# Patient Record
Sex: MALE | Race: WHITE | NOT HISPANIC OR LATINO
[De-identification: names, ages, dates, MRNs, and addresses within clinical notes are randomized per-mention and may not be internally consistent; named-entity substitution may affect disease eponyms.]

---

## 2018-08-05 ENCOUNTER — CHARTING TRANS (OUTPATIENT)
Dept: OTHER | Age: 78
End: 2018-08-05

## 2018-10-31 VITALS
WEIGHT: 187.72 LBS | HEART RATE: 81 BPM | TEMPERATURE: 97.6 F | OXYGEN SATURATION: 97 % | HEIGHT: 71 IN | RESPIRATION RATE: 18 BRPM | BODY MASS INDEX: 26.28 KG/M2

## 2019-07-03 ENCOUNTER — WALK IN (OUTPATIENT)
Dept: URGENT CARE | Age: 79
End: 2019-07-03

## 2019-07-03 VITALS
HEIGHT: 71 IN | DIASTOLIC BLOOD PRESSURE: 80 MMHG | BODY MASS INDEX: 25.2 KG/M2 | RESPIRATION RATE: 16 BRPM | HEART RATE: 80 BPM | TEMPERATURE: 98.1 F | WEIGHT: 180 LBS | SYSTOLIC BLOOD PRESSURE: 160 MMHG

## 2019-07-03 DIAGNOSIS — R05.9 COUGH: ICD-10-CM

## 2019-07-03 DIAGNOSIS — B96.89 ACUTE BACTERIAL SINUSITIS: Primary | ICD-10-CM

## 2019-07-03 DIAGNOSIS — J01.90 ACUTE BACTERIAL SINUSITIS: Primary | ICD-10-CM

## 2019-07-03 PROCEDURE — 99214 OFFICE O/P EST MOD 30 MIN: CPT | Performed by: NURSE PRACTITIONER

## 2019-07-03 RX ORDER — SIMVASTATIN 10 MG
10 TABLET ORAL NIGHTLY
COMMUNITY

## 2019-07-03 RX ORDER — INSULIN ASPART 100 [IU]/ML
INJECTION, SUSPENSION SUBCUTANEOUS
COMMUNITY

## 2019-07-03 RX ORDER — LISINOPRIL 10 MG/1
10 TABLET ORAL DAILY
COMMUNITY

## 2019-07-03 RX ORDER — AZITHROMYCIN 250 MG/1
TABLET, FILM COATED ORAL
Qty: 6 TABLET | Refills: 0 | Status: SHIPPED | OUTPATIENT
Start: 2019-07-03 | End: 2019-07-08

## 2019-07-03 ASSESSMENT — ENCOUNTER SYMPTOMS
SHORTNESS OF BREATH: 0
ALLERGIC/IMMUNOLOGIC NEGATIVE: 1
COUGH: 1
HEMATOLOGIC/LYMPHATIC NEGATIVE: 1
CONSTITUTIONAL NEGATIVE: 1
EYES NEGATIVE: 1
WHEEZING: 0
NEUROLOGICAL NEGATIVE: 1
ENDOCRINE NEGATIVE: 1
PSYCHIATRIC NEGATIVE: 1
SINUS PRESSURE: 1

## 2020-11-05 ENCOUNTER — OFFICE VISIT (OUTPATIENT)
Dept: URBAN - METROPOLITAN AREA CLINIC 41 | Facility: CLINIC | Age: 80
End: 2020-11-05
Payer: MEDICARE

## 2020-11-05 PROCEDURE — 92014 COMPRE OPH EXAM EST PT 1/>: CPT | Performed by: OPHTHALMOLOGY

## 2020-11-05 ASSESSMENT — INTRAOCULAR PRESSURE
OS: 16
OD: 16

## 2020-11-05 NOTE — IMPRESSION/PLAN
Impression: Macular degeneration, wet OD. Status: Symptomatic.
s/p Lolita trial OD
s/p Multiple Avastin last 09/24/2020 (consented 03/05/2020) Plan: The patient notes continued distortion. Exam and OCT reveal a PED. An IVFA from 09/24/2020 demonstrated leakage. Fundus photos from 09/24/2020 demonstrated drusen. Recommend Avastin. R/B/A discussed with patient. The risks of Avastin were discussed, including off-label use and compounding pharmacy risks, and the patient elects to proceed with Avastin (bilateral). The risks of bilateral injections were discussed, and the patient elects to proceed. After 2% Subconjunctival anesthesia & Betadine prep, 1.25mg of Avastin was injected in the eye. Cold compress and Tylenol suggested for pain if needed. Thanks, Supriya Cotter. 

Return in 1 month, OCT OU, re eval Nv AMD

## 2020-11-05 NOTE — IMPRESSION/PLAN
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 Moderate. Plan: DM since about 5. No DME. Blood sugar control.

## 2020-12-17 ENCOUNTER — OFFICE VISIT (OUTPATIENT)
Dept: URBAN - METROPOLITAN AREA CLINIC 41 | Facility: CLINIC | Age: 80
End: 2020-12-17
Payer: MEDICARE

## 2020-12-17 DIAGNOSIS — H35.3231 EXUDATIVE AGE-RELATED MACULAR DEGENERATION, BILATERAL, WITH ACTIVE CHOROIDAL NEOVASCULARIZATION: Primary | ICD-10-CM

## 2020-12-17 PROCEDURE — 92134 CPTRZ OPH DX IMG PST SGM RTA: CPT | Performed by: OPHTHALMOLOGY

## 2020-12-17 PROCEDURE — 92014 COMPRE OPH EXAM EST PT 1/>: CPT | Performed by: OPHTHALMOLOGY

## 2020-12-17 ASSESSMENT — INTRAOCULAR PRESSURE
OS: 13
OD: 12

## 2020-12-17 NOTE — IMPRESSION/PLAN
Impression: Macular degeneration, wet OS. Status: Symptomatic.
s/p Multiple Avastin last 11/5/2020 Plan: Exam and OCT reveal a PED. An IVFA from 09/24/2020 demonstrated leakage. Fundus photos from 09/24/2020 demonstrated drusen. Recommend Avastin. R/B/A discussed with patient. The risks of Avastin were discussed, including off-label use and compounding pharmacy risks, and the patient elects to proceed with Avastin (bilateral).

## 2020-12-17 NOTE — IMPRESSION/PLAN
Impression: Macular degeneration, wet OD. Status: Symptomatic.
s/p Lolita trial OD
s/p Multiple Avastin last 11/5/2020 (consented 03/05/2020) Plan: Exam and OCT reveal a PED. An IVFA from 09/24/2020 demonstrated leakage. Fundus photos from 09/24/2020 demonstrated drusen. Recommend Avastin. R/B/A discussed with patient. The risks of Avastin were discussed, including off-label use and compounding pharmacy risks, and the patient elects to proceed with Avastin (bilateral). The risks of bilateral injections were discussed, and the patient elects to proceed. After 2% Subconjunctival anesthesia & Betadine prep, 1.25mg of Avastin was injected in the eye. Cold compress and Tylenol suggested for pain if needed. Thanks, Suzanna Olszewski. 

Return in 1 month, OCT OU, re eval Nv AMD

## 2020-12-17 NOTE — IMPRESSION/PLAN
Impression: Blepharitis, OU. Status: Chronic.  Plan: [facility]

## 2020-12-17 NOTE — IMPRESSION/PLAN
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 Moderate. Plan: DM since about [address]. No DME. Blood sugar control.

## 2021-01-28 ENCOUNTER — OFFICE VISIT (OUTPATIENT)
Dept: URBAN - METROPOLITAN AREA CLINIC 41 | Facility: CLINIC | Age: 81
End: 2021-01-28
Payer: MEDICARE

## 2021-01-28 DIAGNOSIS — H35.372 PUCKERING OF MACULA, LEFT EYE: ICD-10-CM

## 2021-01-28 PROCEDURE — 92134 CPTRZ OPH DX IMG PST SGM RTA: CPT | Performed by: OPHTHALMOLOGY

## 2021-01-28 PROCEDURE — 99214 OFFICE O/P EST MOD 30 MIN: CPT | Performed by: OPHTHALMOLOGY

## 2021-01-28 ASSESSMENT — INTRAOCULAR PRESSURE
OD: 20
OS: 18

## 2021-01-28 NOTE — IMPRESSION/PLAN
Impression: Macular degeneration, wet OS. Status: Symptomatic.
s/p Multiple Avastin last 12/17/2020 Plan: Exam and OCT reveal a PED. An IVFA from 09/24/2020 demonstrated leakage. Fundus photos from 09/24/2020 demonstrated drusen. Recommend Avastin. R/B/A discussed with patient. The risks of Avastin were discussed, including off-label use and compounding pharmacy risks, and the patient elects to proceed with Avastin (bilateral).

## 2021-01-28 NOTE — IMPRESSION/PLAN
Impression: Macular degeneration, wet OD. Status: Symptomatic.
s/p Lolita trial OD
s/p Multiple Avastin last 12/17/2020 (consented 03/05/2020) Plan: Exam and OCT reveal a PED. An IVFA from 09/24/2020 demonstrated leakage. Fundus photos from 09/24/2020 demonstrated drusen. Recommend Avastin. R/B/A discussed with patient. The risks of Avastin were discussed, including off-label use and compounding pharmacy risks, and the patient elects to proceed with Avastin (bilateral). The risks of bilateral injections were discussed, and the patient elects to proceed. After 2% Subconjunctival anesthesia & Betadine prep, 1.25mg of Avastin was injected in the eye. Cold compress and Tylenol suggested for pain if needed. Thanks, Reva Goldmann. 

Return in 1 month, OCT OU, re eval Nv AMD, possible Lucentis OU Samir Silva)

## 2021-02-09 ENCOUNTER — NEW PATIENT (OUTPATIENT)
Dept: URBAN - METROPOLITAN AREA CLINIC 24 | Facility: CLINIC | Age: 81
End: 2021-02-09
Payer: MEDICARE

## 2021-02-09 DIAGNOSIS — H43.393 OTHER VITREOUS OPACITIES, BILATERAL: ICD-10-CM

## 2021-02-09 PROCEDURE — 99204 OFFICE O/P NEW MOD 45 MIN: CPT | Performed by: OPHTHALMOLOGY

## 2021-02-09 PROCEDURE — 92242 FLUORESCEIN&ICG ANGIOGRAPHY: CPT | Performed by: OPHTHALMOLOGY

## 2021-02-09 PROCEDURE — 92134 CPTRZ OPH DX IMG PST SGM RTA: CPT | Performed by: OPHTHALMOLOGY

## 2021-02-09 ASSESSMENT — INTRAOCULAR PRESSURE
OD: 13
OS: 13

## 2021-03-11 ENCOUNTER — OFFICE VISIT (OUTPATIENT)
Dept: URBAN - METROPOLITAN AREA CLINIC 41 | Facility: CLINIC | Age: 81
End: 2021-03-11
Payer: MEDICARE

## 2021-03-11 DIAGNOSIS — H35.3122 NONEXUDATIVE AGE-RELATED MACULAR DEGENERATION, LEFT EYE, INTERMEDIATE DRY STAGE: ICD-10-CM

## 2021-03-11 DIAGNOSIS — H25.13 AGE-RELATED NUCLEAR CATARACT, BILATERAL: ICD-10-CM

## 2021-03-11 DIAGNOSIS — H43.813 VITREOUS DEGENERATION, BILATERAL: ICD-10-CM

## 2021-03-11 PROCEDURE — 92134 CPTRZ OPH DX IMG PST SGM RTA: CPT | Performed by: OPHTHALMOLOGY

## 2021-03-11 PROCEDURE — 92014 COMPRE OPH EXAM EST PT 1/>: CPT | Performed by: OPHTHALMOLOGY

## 2021-03-11 ASSESSMENT — INTRAOCULAR PRESSURE
OS: 12
OD: 17

## 2021-03-11 NOTE — IMPRESSION/PLAN
Impression: Macular degeneration, wet OS. Status: Symptomatic.
s/p Multiple Avastin last 01/28/2021 Plan: Exam and OCT reveal a PED. An IVFA from 09/24/2020 demonstrated leakage. Fundus photos from 09/24/2020 demonstrated drusen. Recommend Avastin. R/B/A discussed with patient. The risks of Avastin were discussed, including off-label use and compounding pharmacy risks, and the patient elects to proceed with Avastin (bilateral).

## 2021-03-11 NOTE — IMPRESSION/PLAN
Impression: Macular degeneration, wet OD. Status: Symptomatic.
s/p Lolita trial OD
s/p Multiple Avastin last 01/28/2021 (consented 03/05/2020) Plan: Exam and OCT reveal a PED. An IVFA from 09/24/2020 demonstrated leakage. Fundus photos from 09/24/2020 demonstrated drusen. Recommend Avastin. R/B/A discussed with patient. The risks of Avastin were discussed, including off-label use and compounding pharmacy risks, and the patient elects to proceed with Avastin (bilateral). The risks of bilateral injections were discussed, and the patient elects to proceed. After 2% Subconjunctival anesthesia & Betadine prep, 1.25mg of Avastin was injected in the eye. Cold compress and Tylenol suggested for pain if needed. Thanks, Miguel Ángel Ledezma. 

Return in 1 month, OCT OU, re eval Nv AMD, possible Lucentis OU (auth), IVFA OD 1st

## 2021-05-06 ENCOUNTER — OFFICE VISIT (OUTPATIENT)
Dept: URBAN - METROPOLITAN AREA CLINIC 41 | Facility: CLINIC | Age: 81
End: 2021-05-06
Payer: MEDICARE

## 2021-05-06 PROCEDURE — 92235 FLUORESCEIN ANGRPH MLTIFRAME: CPT | Performed by: OPHTHALMOLOGY

## 2021-05-06 PROCEDURE — 92134 CPTRZ OPH DX IMG PST SGM RTA: CPT | Performed by: OPHTHALMOLOGY

## 2021-05-06 PROCEDURE — 92014 COMPRE OPH EXAM EST PT 1/>: CPT | Performed by: OPHTHALMOLOGY

## 2021-05-06 ASSESSMENT — INTRAOCULAR PRESSURE
OS: 12
OD: 12

## 2021-05-06 NOTE — IMPRESSION/PLAN
Impression: Macular degeneration, wet OD. Status: Symptomatic.
s/p Lolita trial OD
s/p Multiple Avastin last 01/28/2021 (consented 03/05/2020) Plan: The patient notes worsening. Exam and OCT reveal a PED. An IVFA from 05/06/2021 demonstrated leakage. Fundus photos from 05/06/2021 demonstrated drusen. Recommend Lucentis. R/B/A discussed with patient. Patient elects to proceed with Lucentis 0.5mg (bilateral) today. The risks of bilateral injections were discussed, and the patient elects to proceed. After 2% Subconjunctival anesthesia & betadine prep, Lucentis was injected in the eye with no complications. Overfill discarded. Cold compress and Tylenol suggested for pain if needed. 

Return in 1 month, OCT OU, re eval Nv AMD, possible Lucentis OU, and in [facility] for July and August

## 2021-05-06 NOTE — IMPRESSION/PLAN
Impression: Macular degeneration, wet OS. Status: Symptomatic.
s/p Multiple Avastin last 01/28/2021 Plan: Exam and OCT reveal a PED. An IVFA from 05/06/2021 demonstrated leakage. Fundus photos from 05/06/2021 demonstrated drusen. Recommend Lucentis. R/B/A discussed with patient. Patient elects to proceed with Lucentis 0.5mg today. After 2% Subconjunctival anesthesia & betadine prep, Lucentis was injected in the eye with no complications. Overfill discarded. Cold compress and Tylenol suggested for pain if needed.

## 2021-07-01 ENCOUNTER — OFFICE VISIT (OUTPATIENT)
Dept: URBAN - METROPOLITAN AREA CLINIC 41 | Facility: CLINIC | Age: 81
End: 2021-07-01
Payer: MEDICARE

## 2021-07-01 DIAGNOSIS — H01.003 UNSPECIFIED BLEPHARITIS RIGHT EYE, UNSPECIFIED EYELID: ICD-10-CM

## 2021-07-01 PROCEDURE — 92134 CPTRZ OPH DX IMG PST SGM RTA: CPT | Performed by: OPHTHALMOLOGY

## 2021-07-01 PROCEDURE — 99214 OFFICE O/P EST MOD 30 MIN: CPT | Performed by: OPHTHALMOLOGY

## 2021-07-01 ASSESSMENT — INTRAOCULAR PRESSURE
OD: 13
OS: 14

## 2021-07-01 NOTE — IMPRESSION/PLAN
Impression: Macular degeneration, wet OD. Status: Symptomatic.
s/p Lolita trial OD
s/p Multiple Avastin last 01/28/2021
s/p Lucentis x 1 05/06/2021 Plan: The patient will work out insurance details on Lucentis. Exam and OCT reveal a PED. An IVFA from 05/06/2021 demonstrated leakage. Fundus photos from 05/06/2021 demonstrated drusen. Recommend Avastin. R/B/A discussed with patient. Patient elects to proceed with Avatin (bilateral) today. The risks of bilateral injections were discussed, and the patient elects to proceed. After 2% Subconjunctival anesthesia & betadine prep, Avastin was injected in the eye with no complications. Overfill discarded. Cold compress and Tylenol suggested for pain if needed. 

Return in 1 month, OCT OU, re eval Nv AMD, possible Avastin OU, and in [facility] for July and August

## 2021-07-01 NOTE — IMPRESSION/PLAN
Impression: Macular degeneration, wet OS. Status: Symptomatic.
s/p Multiple Avastin last 01/28/2021
s/p Lucentis 05/06/2021 Plan: Exam and OCT reveal a PED. An IVFA from 05/06/2021 demonstrated leakage. Fundus photos from 05/06/2021 demonstrated drusen. Recommend Avastin. R/B/A discussed with patient. Patient elects to proceed with Avatin (bilateral) today. The risks of bilateral injections were discussed, and the patient elects to proceed. After 2% Subconjunctival anesthesia & betadine prep, Avastin was injected in the eye with no complications. Overfill discarded. Cold compress and Tylenol suggested for pain if needed.

## 2021-10-07 ENCOUNTER — OFFICE VISIT (OUTPATIENT)
Dept: URBAN - METROPOLITAN AREA CLINIC 41 | Facility: CLINIC | Age: 81
End: 2021-10-07
Payer: MEDICARE

## 2021-10-07 DIAGNOSIS — H35.413 LATTICE DEGENERATION OF RETINA, BILATERAL: ICD-10-CM

## 2021-10-07 DIAGNOSIS — E11.3393 TYPE 2 DIAB WITH MOD NONP RTNOP WITHOUT MACULAR EDEMA, BI: ICD-10-CM

## 2021-10-07 PROCEDURE — 99214 OFFICE O/P EST MOD 30 MIN: CPT | Performed by: OPHTHALMOLOGY

## 2021-10-07 PROCEDURE — 92134 CPTRZ OPH DX IMG PST SGM RTA: CPT | Performed by: OPHTHALMOLOGY

## 2021-10-07 ASSESSMENT — INTRAOCULAR PRESSURE
OD: 16
OS: 14

## 2021-10-07 NOTE — IMPRESSION/PLAN
Impression: Macular degeneration, wet OD. Status: Symptomatic.
s/p Lolita trial OD
s/p Multiple Avastin last 07/01/2021 
s/p Lucentis x 1 05/06/2021 Plan: Exam and OCT reveal a PED. An IVFA from 05/06/2021 demonstrated leakage. Fundus photos from 05/06/2021 demonstrated drusen. Recommend Avastin. R/B/A discussed with patient. Patient elects to proceed with Avatin (bilateral) today. The risks of bilateral injections were discussed, and the patient elects to proceed. After 2% Subconjunctival anesthesia & betadine prep, Avastin was injected in the eye with no complications. Overfill discarded. Cold compress and Tylenol suggested for pain if needed. 

Return in 1 month, OCT OU, re eval Nv AMD, possible Avastin OU, IVFA OD 1st

## 2021-10-07 NOTE — IMPRESSION/PLAN
Impression: Macular degeneration, wet OS. Status: Symptomatic.
s/p Multiple Avastin last 07/01/2021 
s/p Lucentis 05/06/2021 Plan: Exam and OCT reveal a PED. An IVFA from 05/06/2021 demonstrated leakage. Fundus photos from 05/06/2021 demonstrated drusen. Recommend Avastin. R/B/A discussed with patient. Patient elects to proceed with Avatin (bilateral) today. The risks of bilateral injections were discussed, and the patient elects to proceed. After 2% Subconjunctival anesthesia & betadine prep, Avastin was injected in the eye with no complications. Overfill discarded. Cold compress and Tylenol suggested for pain if needed.

## 2022-01-13 ENCOUNTER — OFFICE VISIT (OUTPATIENT)
Dept: URBAN - METROPOLITAN AREA CLINIC 41 | Facility: CLINIC | Age: 82
End: 2022-01-13
Payer: MEDICARE

## 2022-01-13 DIAGNOSIS — H04.123 DRY EYE SYNDROME OF BILATERAL LACRIMAL GLANDS: ICD-10-CM

## 2022-01-13 PROCEDURE — 92235 FLUORESCEIN ANGRPH MLTIFRAME: CPT | Performed by: OPHTHALMOLOGY

## 2022-01-13 PROCEDURE — 99214 OFFICE O/P EST MOD 30 MIN: CPT | Performed by: OPHTHALMOLOGY

## 2022-01-13 PROCEDURE — 92134 CPTRZ OPH DX IMG PST SGM RTA: CPT | Performed by: OPHTHALMOLOGY

## 2022-01-13 ASSESSMENT — INTRAOCULAR PRESSURE
OS: 16
OD: 11

## 2022-01-13 NOTE — IMPRESSION/PLAN
Impression: Macular degeneration, wet OS. Status: Symptomatic.
s/p Multiple Avastin last 10/07/2021 
s/p Lucentis 05/06/2021 Plan: Exam and OCT reveal a PED. An IVFA from 01/13/2022 demonstrated leakage. Fundus photos from 01/13/2022 demonstrated drusen. Recommend Avastin. R/B/A discussed with patient. Patient elects to proceed with Avatin (bilateral) today. The risks of bilateral injections were discussed, and the patient elects to proceed. After 2% Subconjunctival anesthesia & betadine prep, Avastin was injected in the eye with no complications. Overfill discarded. Cold compress and Tylenol suggested for pain if needed.

## 2022-01-13 NOTE — IMPRESSION/PLAN
Impression: Macular degeneration, wet OD. Status: Symptomatic.
s/p Lolita trial OD
s/p Multiple Avastin last 10/07/2021 
s/p Lucentis x 1 05/06/2021 Plan: The patient notes blurring. Exam and OCT reveal a PED. An IVFA from 01/13/2022 demonstrated leakage. Fundus photos from 01/13/2022 demonstrated drusen. Recommend Avastin. R/B/A discussed with patient. Patient elects to proceed with Avatin (bilateral) today. The risks of bilateral injections were discussed, and the patient elects to proceed. After 2% Subconjunctival anesthesia & betadine prep, Avastin was injected in the eye with no complications. Overfill discarded. Cold compress and Tylenol suggested for pain if needed. 

Return in 1 month, OCT OU, re eval Nv AMD, possible Avastin OU

## 2022-01-13 NOTE — IMPRESSION/PLAN
Impression: Cataracts, OU. Status: Symptomatic.  Plan: Cleared for CE from the retinal standpoint at your discretion

## 2022-05-05 ENCOUNTER — OFFICE VISIT (OUTPATIENT)
Dept: URBAN - METROPOLITAN AREA CLINIC 41 | Facility: CLINIC | Age: 82
End: 2022-05-05
Payer: MEDICARE

## 2022-05-05 DIAGNOSIS — E11.3393 TYPE 2 DIAB WITH MOD NONP RTNOP WITHOUT MACULAR EDEMA, BI: ICD-10-CM

## 2022-05-05 DIAGNOSIS — H01.003 UNSPECIFIED BLEPHARITIS RIGHT EYE, UNSPECIFIED EYELID: ICD-10-CM

## 2022-05-05 DIAGNOSIS — H35.413 LATTICE DEGENERATION OF RETINA, BILATERAL: ICD-10-CM

## 2022-05-05 DIAGNOSIS — H35.3231 EXUDATIVE AGE-RELATED MACULAR DEGENERATION, BILATERAL, WITH ACTIVE CHOROIDAL NEOVASCULARIZATION: Primary | ICD-10-CM

## 2022-05-05 DIAGNOSIS — H43.813 VITREOUS DEGENERATION, BILATERAL: ICD-10-CM

## 2022-05-05 DIAGNOSIS — H04.123 DRY EYE SYNDROME OF BILATERAL LACRIMAL GLANDS: ICD-10-CM

## 2022-05-05 DIAGNOSIS — H25.13 AGE-RELATED NUCLEAR CATARACT, BILATERAL: ICD-10-CM

## 2022-05-05 PROCEDURE — 92134 CPTRZ OPH DX IMG PST SGM RTA: CPT | Performed by: OPHTHALMOLOGY

## 2022-05-05 PROCEDURE — 92014 COMPRE OPH EXAM EST PT 1/>: CPT | Performed by: OPHTHALMOLOGY

## 2022-05-05 ASSESSMENT — INTRAOCULAR PRESSURE
OD: 13
OS: 13

## 2022-05-05 NOTE — IMPRESSION/PLAN
Impression: Macular degeneration, wet OS. Status: Symptomatic.
s/p Multiple Avastin last 01/13/2022
s/p Lucentis 05/06/2021 Plan: Exam and OCT reveal a PED. An IVFA from 01/13/2022 demonstrated leakage. Fundus photos from 01/13/2022 demonstrated drusen. Recommend Avastin. R/B/A discussed with patient. Patient elects to proceed with Avatin (bilateral) today. The risks of bilateral injections were discussed, and the patient elects to proceed. After 2% Subconjunctival anesthesia & betadine prep, Avastin was injected in the eye with no complications. Overfill discarded. Cold compress and Tylenol suggested for pain if needed.

## 2022-05-05 NOTE — IMPRESSION/PLAN
Impression: Macular degeneration, wet OD. Status: Symptomatic.
s/p Lolita trial OD
s/p Multiple Avastin last 01/13/2022 
s/p Lucentis x 1 05/06/2021 Plan: The patient notes continued blurring. Exam and OCT reveal a PED. An IVFA from 01/13/2022 demonstrated leakage. Fundus photos from 01/13/2022 demonstrated drusen. Recommend Avastin. R/B/A discussed with patient. Patient elects to proceed with Avatin (bilateral) today. The risks of bilateral injections were discussed, and the patient elects to proceed. After 2% Subconjunctival anesthesia & betadine prep, Avastin was injected in the eye with no complications. Overfill discarded. Cold compress and Tylenol suggested for pain if needed. 

Return in 1 month, OCT OU, re eval Nv AMD, possible Avastin OU

## 2022-09-22 ENCOUNTER — OFFICE VISIT (OUTPATIENT)
Dept: URBAN - METROPOLITAN AREA CLINIC 41 | Facility: CLINIC | Age: 82
End: 2022-09-22
Payer: MEDICARE

## 2022-09-22 DIAGNOSIS — H04.123 DRY EYE SYNDROME OF BILATERAL LACRIMAL GLANDS: ICD-10-CM

## 2022-09-22 DIAGNOSIS — H01.003 UNSPECIFIED BLEPHARITIS RIGHT EYE, UNSPECIFIED EYELID: ICD-10-CM

## 2022-09-22 DIAGNOSIS — E11.3393 TYPE 2 DIAB WITH MOD NONP RTNOP WITHOUT MACULAR EDEMA, BI: ICD-10-CM

## 2022-09-22 DIAGNOSIS — H35.3231 EXUDATIVE AGE-RELATED MACULAR DEGENERATION, BILATERAL, WITH ACTIVE CHOROIDAL NEOVASCULARIZATION: Primary | ICD-10-CM

## 2022-09-22 DIAGNOSIS — H43.813 VITREOUS DEGENERATION, BILATERAL: ICD-10-CM

## 2022-09-22 DIAGNOSIS — H25.13 AGE-RELATED NUCLEAR CATARACT, BILATERAL: ICD-10-CM

## 2022-09-22 DIAGNOSIS — H35.413 LATTICE DEGENERATION OF RETINA, BILATERAL: ICD-10-CM

## 2022-09-22 PROCEDURE — 92134 CPTRZ OPH DX IMG PST SGM RTA: CPT | Performed by: OPHTHALMOLOGY

## 2022-09-22 PROCEDURE — 99214 OFFICE O/P EST MOD 30 MIN: CPT | Performed by: OPHTHALMOLOGY

## 2022-09-22 ASSESSMENT — INTRAOCULAR PRESSURE
OD: 10
OS: 11

## 2022-09-22 NOTE — IMPRESSION/PLAN
Impression: Macular degeneration, wet OS. Status: Symptomatic.
s/p Multiple Avastin last 05/05/2022 
s/p Lucentis 05/06/2021 Plan: Exam and OCT reveal a PED. An IVFA from 01/13/2022 demonstrated leakage. Fundus photos from 01/13/2022 demonstrated drusen. Recommend Avastin. R/B/A discussed with patient. Patient elects to proceed with Avatin (bilateral) today. The risks of bilateral injections were discussed, and the patient elects to proceed. After 2% Subconjunctival anesthesia & betadine prep, Avastin was injected in the eye with no complications. Overfill discarded. Cold compress and Tylenol suggested for pain if needed.

## 2022-09-22 NOTE — IMPRESSION/PLAN
Impression: Macular degeneration, wet OD. Status: Symptomatic.
s/p Lolita trial OD
s/p Multiple Avastin last 05/05/2022 
s/p Lucentis x 1 05/06/2021 Plan: Exam and OCT reveal a PED. An IVFA from 01/13/2022 demonstrated leakage. Fundus photos from 01/13/2022 demonstrated drusen. Recommend Avastin. R/B/A discussed with patient. Patient elects to proceed with Avatin (bilateral) today. The risks of bilateral injections were discussed, and the patient elects to proceed. After 2% Subconjunctival anesthesia & betadine prep, Avastin was injected in the eye with no complications. Overfill discarded. Cold compress and Tylenol suggested for pain if needed. 

Return in 1 month, OCT OU, re eval Nv AMD, possible Avastin OU, IVFA OD 1st

## 2022-12-01 ENCOUNTER — OFFICE VISIT (OUTPATIENT)
Dept: URBAN - METROPOLITAN AREA CLINIC 41 | Facility: CLINIC | Age: 82
End: 2022-12-01
Payer: MEDICARE

## 2022-12-01 DIAGNOSIS — H35.413 LATTICE DEGENERATION OF RETINA, BILATERAL: ICD-10-CM

## 2022-12-01 DIAGNOSIS — H35.3231 EXUDATIVE AGE-RELATED MACULAR DEGENERATION, BILATERAL, WITH ACTIVE CHOROIDAL NEOVASCULARIZATION: Primary | ICD-10-CM

## 2022-12-01 DIAGNOSIS — E11.3393 TYPE 2 DIAB WITH MOD NONP RTNOP WITHOUT MACULAR EDEMA, BI: ICD-10-CM

## 2022-12-01 DIAGNOSIS — H04.123 DRY EYE SYNDROME OF BILATERAL LACRIMAL GLANDS: ICD-10-CM

## 2022-12-01 DIAGNOSIS — H43.813 VITREOUS DEGENERATION, BILATERAL: ICD-10-CM

## 2022-12-01 DIAGNOSIS — H25.13 AGE-RELATED NUCLEAR CATARACT, BILATERAL: ICD-10-CM

## 2022-12-01 DIAGNOSIS — H01.003 UNSPECIFIED BLEPHARITIS RIGHT EYE, UNSPECIFIED EYELID: ICD-10-CM

## 2022-12-01 PROCEDURE — 92134 CPTRZ OPH DX IMG PST SGM RTA: CPT | Performed by: OPHTHALMOLOGY

## 2022-12-01 PROCEDURE — 92014 COMPRE OPH EXAM EST PT 1/>: CPT | Performed by: OPHTHALMOLOGY

## 2022-12-01 ASSESSMENT — INTRAOCULAR PRESSURE
OD: 13
OS: 12

## 2022-12-01 NOTE — IMPRESSION/PLAN
Impression: Macular degeneration, wet OD. Status: Symptomatic.
s/p Lolita trial OD
s/p Multiple Avastin last 09/22/2022 
s/p Lucentis x 1 05/06/2021 Plan: Exam and OCT reveal a PED. An IVFA from 01/13/2022 demonstrated leakage. Fundus photos from 01/13/2022 demonstrated drusen. Recommend Avastin. R/B/A discussed with patient. Patient elects to proceed with Avatin (bilateral) today. The risks of bilateral injections were discussed, and the patient elects to proceed. After 2% Subconjunctival anesthesia & betadine prep, Avastin was injected in the eye with no complications. Overfill discarded. Cold compress and Tylenol suggested for pain if needed. 

Return in 1 month, OCT OU, re eval Nv AMD, possible Avastin OU, IVFA OD 1st

## 2022-12-01 NOTE — IMPRESSION/PLAN
Impression: Macular degeneration, wet OS. Status: Symptomatic.
s/p Multiple Avastin last 09/22/2022 
s/p Lucentis 05/06/2021 Plan: Exam and OCT reveal a PED. An IVFA from 01/13/2022 demonstrated leakage. Fundus photos from 01/13/2022 demonstrated drusen. Recommend Avastin. R/B/A discussed with patient. Patient elects to proceed with Avatin (bilateral) today. The risks of bilateral injections were discussed, and the patient elects to proceed. After 2% Subconjunctival anesthesia & betadine prep, Avastin was injected in the eye with no complications. Overfill discarded. Cold compress and Tylenol suggested for pain if needed.

## 2023-02-09 ENCOUNTER — OFFICE VISIT (OUTPATIENT)
Dept: URBAN - METROPOLITAN AREA CLINIC 41 | Facility: CLINIC | Age: 83
End: 2023-02-09
Payer: MEDICARE

## 2023-02-09 DIAGNOSIS — H35.3231 EXUDATIVE AGE-RELATED MACULAR DEGENERATION, BILATERAL, WITH ACTIVE CHOROIDAL NEOVASCULARIZATION: Primary | ICD-10-CM

## 2023-02-09 DIAGNOSIS — H43.813 VITREOUS DEGENERATION, BILATERAL: ICD-10-CM

## 2023-02-09 DIAGNOSIS — H04.123 DRY EYE SYNDROME OF BILATERAL LACRIMAL GLANDS: ICD-10-CM

## 2023-02-09 DIAGNOSIS — H01.003 UNSPECIFIED BLEPHARITIS RIGHT EYE, UNSPECIFIED EYELID: ICD-10-CM

## 2023-02-09 DIAGNOSIS — E11.3393 TYPE 2 DIAB WITH MOD NONP RTNOP WITHOUT MACULAR EDEMA, BI: ICD-10-CM

## 2023-02-09 DIAGNOSIS — H35.413 LATTICE DEGENERATION OF RETINA, BILATERAL: ICD-10-CM

## 2023-02-09 DIAGNOSIS — H25.13 AGE-RELATED NUCLEAR CATARACT, BILATERAL: ICD-10-CM

## 2023-02-09 PROCEDURE — 92235 FLUORESCEIN ANGRPH MLTIFRAME: CPT | Performed by: OPHTHALMOLOGY

## 2023-02-09 PROCEDURE — 92014 COMPRE OPH EXAM EST PT 1/>: CPT | Performed by: OPHTHALMOLOGY

## 2023-02-09 PROCEDURE — 92134 CPTRZ OPH DX IMG PST SGM RTA: CPT | Performed by: OPHTHALMOLOGY

## 2023-02-09 ASSESSMENT — INTRAOCULAR PRESSURE
OD: 15
OS: 15

## 2023-02-09 NOTE — IMPRESSION/PLAN
Impression: Macular degeneration, wet OD. Status: Symptomatic.
s/p Lolita trial OD
s/p Multiple Avastin last 12/01/2022
s/p Lucentis x 1 05/06/2021 Plan: Exam and OCT reveal a PED. An IVFA from 02/09/2023 demonstrated leakage. Fundus photos from 02/09/2023 demonstrated drusen. Recommend Avastin. R/B/A discussed with patient. Patient elects to proceed with Avatin (bilateral) today. The risks of bilateral injections were discussed, and the patient elects to proceed. After 2% Subconjunctival anesthesia & betadine prep, Avastin was injected in the eye with no complications. Overfill discarded. Cold compress and Tylenol suggested for pain if needed. 

Return in 1 6-8 weeks for OCT OU, re eval Nv AMD, possible Avastin OU, and will attempt to treat and extend

## 2023-02-09 NOTE — IMPRESSION/PLAN
Impression: Macular degeneration, wet OS. Status: Symptomatic.
s/p Multiple Avastin last 12/01/2022 
s/p Lucentis 05/06/2021 Plan: Exam and OCT reveal a PED. An IVFA from 02/09/2023 demonstrated leakage. Fundus photos from 02/09/2023 demonstrated drusen. Recommend Avastin. R/B/A discussed with patient. Patient elects to proceed with Avatin (bilateral) today. The risks of bilateral injections were discussed, and the patient elects to proceed. After 2% Subconjunctival anesthesia & betadine prep, Avastin was injected in the eye with no complications. Overfill discarded. Cold compress and Tylenol suggested for pain if needed.

## 2023-04-20 ENCOUNTER — OFFICE VISIT (OUTPATIENT)
Dept: URBAN - METROPOLITAN AREA CLINIC 41 | Facility: CLINIC | Age: 83
End: 2023-04-20
Payer: MEDICARE

## 2023-04-20 DIAGNOSIS — H01.003 UNSPECIFIED BLEPHARITIS RIGHT EYE, UNSPECIFIED EYELID: ICD-10-CM

## 2023-04-20 DIAGNOSIS — H35.413 LATTICE DEGENERATION OF RETINA, BILATERAL: ICD-10-CM

## 2023-04-20 DIAGNOSIS — H25.13 AGE-RELATED NUCLEAR CATARACT, BILATERAL: ICD-10-CM

## 2023-04-20 DIAGNOSIS — E11.3393 TYPE 2 DIAB WITH MOD NONP RTNOP WITHOUT MACULAR EDEMA, BI: ICD-10-CM

## 2023-04-20 DIAGNOSIS — H04.123 DRY EYE SYNDROME OF BILATERAL LACRIMAL GLANDS: ICD-10-CM

## 2023-04-20 DIAGNOSIS — H43.813 VITREOUS DEGENERATION, BILATERAL: ICD-10-CM

## 2023-04-20 DIAGNOSIS — H35.3231 EXUDATIVE AGE-RELATED MACULAR DEGENERATION, BILATERAL, WITH ACTIVE CHOROIDAL NEOVASCULARIZATION: Primary | ICD-10-CM

## 2023-04-20 PROCEDURE — 92134 CPTRZ OPH DX IMG PST SGM RTA: CPT | Performed by: OPHTHALMOLOGY

## 2023-04-20 PROCEDURE — 92014 COMPRE OPH EXAM EST PT 1/>: CPT | Performed by: OPHTHALMOLOGY

## 2023-04-20 ASSESSMENT — INTRAOCULAR PRESSURE
OD: 11
OS: 13

## 2023-04-20 NOTE — IMPRESSION/PLAN
Impression: Macular degeneration, wet OD. Status: Symptomatic.
s/p Lolita trial OD
s/p Multiple Avastin last 02/09/2023 
s/p Lucentis x 1 05/06/2021 Plan: Exam and OCT reveal a PED. An IVFA from 02/09/2023 demonstrated leakage. Fundus photos from 02/09/2023 demonstrated drusen. Recommend Avastin. R/B/A discussed with patient. Patient elects to proceed with Avatin (bilateral) today. The risks of bilateral injections were discussed, and the patient elects to proceed. After 2% Subconjunctival anesthesia & betadine prep, Avastin was injected in the eye with no complications. Overfill discarded. Cold compress and Tylenol suggested for pain if needed. AREDS caroteniods Return in 6-8 weeks for OCT OU, re eval Nv AMD, possible Avastin OU, and will attempt to treat and extend

## 2023-04-20 NOTE — IMPRESSION/PLAN
Impression: Macular degeneration, wet OS. Status: Symptomatic.
s/p Multiple Avastin last 02/09/2023
s/p Lucentis 05/06/2021 Plan: Exam and OCT reveal a PED. An IVFA from 02/09/2023 demonstrated leakage. Fundus photos from 02/09/2023 demonstrated drusen. Recommend Avastin. R/B/A discussed with patient. Patient elects to proceed with Avatin (bilateral) today. The risks of bilateral injections were discussed, and the patient elects to proceed. After 2% Subconjunctival anesthesia & betadine prep, Avastin was injected in the eye with no complications. Overfill discarded. Cold compress and Tylenol suggested for pain if needed.

## 2023-07-18 ENCOUNTER — OFFICE VISIT (OUTPATIENT)
Dept: URBAN - METROPOLITAN AREA CLINIC 41 | Facility: CLINIC | Age: 83
End: 2023-07-18
Payer: MEDICARE

## 2023-07-18 DIAGNOSIS — H43.813 VITREOUS DEGENERATION, BILATERAL: ICD-10-CM

## 2023-07-18 DIAGNOSIS — H25.13 AGE-RELATED NUCLEAR CATARACT, BILATERAL: ICD-10-CM

## 2023-07-18 DIAGNOSIS — H01.003 UNSPECIFIED BLEPHARITIS RIGHT EYE, UNSPECIFIED EYELID: ICD-10-CM

## 2023-07-18 DIAGNOSIS — H35.3231 EXUDATIVE AGE-RELATED MACULAR DEGENERATION, BILATERAL, WITH ACTIVE CHOROIDAL NEOVASCULARIZATION: Primary | ICD-10-CM

## 2023-07-18 DIAGNOSIS — E11.3393 TYPE 2 DIAB WITH MOD NONP RTNOP WITHOUT MACULAR EDEMA, BI: ICD-10-CM

## 2023-07-18 DIAGNOSIS — H35.413 LATTICE DEGENERATION OF RETINA, BILATERAL: ICD-10-CM

## 2023-07-18 PROCEDURE — 92134 CPTRZ OPH DX IMG PST SGM RTA: CPT | Performed by: OPHTHALMOLOGY

## 2023-07-18 ASSESSMENT — INTRAOCULAR PRESSURE
OS: 12
OD: 15

## 2023-07-18 NOTE — IMPRESSION/PLAN
Impression: Macular degeneration, wet OU. Status: Symptomatic.
s/p Lolita trial OD
s/p Multiple Avastin OU last 04/20/23 ([facility])
s/p Lucentis OU x 1 05/06/21 Plan: Exam and OCT reveal a stable PED OU. An IVFA from 02/09/2023 demonstrated leakage. Fundus photos from 02/09/2023 demonstrated drusen. Recommend Avastin OU. R/B/A discussed with patient. Patient elects to proceed with Avatin (bilateral) today. The risks of bilateral injections were discussed, and the patient elects to proceed. After 2% Subconjunctival anesthesia & betadine prep, Avastin OU was injected in the eye with no complications. Overfill discarded. Cold compress and Tylenol suggested for pain if needed. AREDS-2 Return in 12 weeks for OCT OU, re eval Nv AMD, possible Avastin OU

## 2023-10-23 ENCOUNTER — OFFICE VISIT (OUTPATIENT)
Dept: URBAN - METROPOLITAN AREA CLINIC 27 | Facility: CLINIC | Age: 83
End: 2023-10-23
Payer: MEDICARE

## 2023-10-23 DIAGNOSIS — H35.3231 EXUDATIVE AGE-RELATED MACULAR DEGENERATION, BILATERAL, WITH ACTIVE CHOROIDAL NEOVASCULARIZATION: Primary | ICD-10-CM

## 2023-10-23 DIAGNOSIS — E11.3393 TYPE 2 DIAB WITH MOD NONP RTNOP WITHOUT MACULAR EDEMA, BI: ICD-10-CM

## 2023-10-23 PROCEDURE — 92134 CPTRZ OPH DX IMG PST SGM RTA: CPT | Performed by: OPHTHALMOLOGY

## 2023-10-23 PROCEDURE — 99213 OFFICE O/P EST LOW 20 MIN: CPT | Performed by: OPHTHALMOLOGY

## 2023-10-23 ASSESSMENT — INTRAOCULAR PRESSURE
OD: 21
OS: 19

## 2024-01-04 ENCOUNTER — OFFICE VISIT (OUTPATIENT)
Dept: URBAN - METROPOLITAN AREA CLINIC 41 | Facility: CLINIC | Age: 84
End: 2024-01-04
Payer: MEDICARE

## 2024-01-04 DIAGNOSIS — H25.13 AGE-RELATED NUCLEAR CATARACT, BILATERAL: ICD-10-CM

## 2024-01-04 DIAGNOSIS — H35.3231 EXUDATIVE AGE-RELATED MACULAR DEGENERATION, BILATERAL, WITH ACTIVE CHOROIDAL NEOVASCULARIZATION: Primary | ICD-10-CM

## 2024-01-04 DIAGNOSIS — E11.3393 TYPE 2 DIAB WITH MOD NONP RTNOP WITHOUT MACULAR EDEMA, BI: ICD-10-CM

## 2024-01-04 DIAGNOSIS — H35.413 LATTICE DEGENERATION OF RETINA, BILATERAL: ICD-10-CM

## 2024-01-04 DIAGNOSIS — H43.813 VITREOUS DEGENERATION, BILATERAL: ICD-10-CM

## 2024-01-04 PROCEDURE — 99213 OFFICE O/P EST LOW 20 MIN: CPT | Performed by: OPHTHALMOLOGY

## 2024-01-04 PROCEDURE — 92134 CPTRZ OPH DX IMG PST SGM RTA: CPT | Performed by: OPHTHALMOLOGY

## 2024-01-04 ASSESSMENT — INTRAOCULAR PRESSURE
OD: 19
OS: 18

## 2024-03-28 ENCOUNTER — OFFICE VISIT (OUTPATIENT)
Dept: URBAN - METROPOLITAN AREA CLINIC 41 | Facility: CLINIC | Age: 84
End: 2024-03-28
Payer: MEDICARE

## 2024-03-28 DIAGNOSIS — H25.13 AGE-RELATED NUCLEAR CATARACT, BILATERAL: ICD-10-CM

## 2024-03-28 DIAGNOSIS — H35.413 LATTICE DEGENERATION OF RETINA, BILATERAL: ICD-10-CM

## 2024-03-28 DIAGNOSIS — E11.3393 TYPE 2 DIAB WITH MOD NONP RTNOP WITHOUT MACULAR EDEMA, BI: ICD-10-CM

## 2024-03-28 DIAGNOSIS — H43.813 VITREOUS DEGENERATION, BILATERAL: ICD-10-CM

## 2024-03-28 DIAGNOSIS — H35.3231 EXUDATIVE AGE-RELATED MACULAR DEGENERATION, BILATERAL, WITH ACTIVE CHOROIDAL NEOVASCULARIZATION: Primary | ICD-10-CM

## 2024-03-28 PROCEDURE — 92134 CPTRZ OPH DX IMG PST SGM RTA: CPT | Performed by: OPHTHALMOLOGY

## 2024-03-28 PROCEDURE — 99213 OFFICE O/P EST LOW 20 MIN: CPT | Performed by: OPHTHALMOLOGY

## 2024-03-28 ASSESSMENT — INTRAOCULAR PRESSURE
OS: 12
OD: 12